# Patient Record
(demographics unavailable — no encounter records)

---

## 2024-11-07 NOTE — PHYSICAL EXAM
[Alert] : alert [Well Nourished] : well nourished [No Acute Distress] : no acute distress [Well Developed] : well developed [Normal Sclera/Conjunctiva] : normal sclera/conjunctiva [EOMI] : extra ocular movement intact [Normal Oropharynx] : the oropharynx was normal [No Respiratory Distress] : no respiratory distress [No Accessory Muscle Use] : no accessory muscle use [Clear to Auscultation] : lungs were clear to auscultation bilaterally [Normal S1, S2] : normal S1 and S2 [Normal Rate] : heart rate was normal [Regular Rhythm] : with a regular rhythm [No Edema] : no peripheral edema [Pedal Pulses Normal] : the pedal pulses are present [Normal Bowel Sounds] : normal bowel sounds [Not Tender] : non-tender [Not Distended] : not distended [Soft] : abdomen soft [Normal Anterior Cervical Nodes] : no anterior cervical lymphadenopathy [No Tremors] : no tremors [Oriented x3] : oriented to person, place, and time [Acanthosis Nigricans] : no acanthosis nigricans [de-identified] : slight proptosis  [de-identified] : L thyroid nodule palpated, thyroid mild enlarged

## 2024-11-07 NOTE — ASSESSMENT
[FreeTextEntry1] : She just got diagnosed w hemochromatosis/.   51  year old female with hyperthyroidism secondary to Grave's disease with mild ABIMAEL. Now She developed significant hypoThyroidism .  check Tpo and Tg ab. Hashimoto's' taking over??  thyroid US shows no nodules.  ABIMAEL continue to followup with ophthalmo DR Huff .   continue selenium to 100 mg BID  complains of excessive lacrimation just started Lt4 50 mcg qd 1 week ago claims she feels better since them. Repeat Tfts in 5 weeks and will adjust the dose.   Overweight:  gained 7 lbs. discussed diet and exercise encouraged more exercise walking 30 min 3 x week  Vitamin d defic : cont supplements   HLD: lipid way worse bc of hypoT. Will repeat once tfts corrected.   No h/o CAD  Hot flashes: use estro patches from gyn   All lab results reviewed independently and discussed with patient with extensive discussion.  All questions answered Laboratory tests ordered today Continue other current medications

## 2025-02-10 NOTE — HISTORY OF PRESENT ILLNESS
[FreeTextEntry1] : Patient presents today for routine follow-up of hyperthyroidism secondary to Graves disease. TSI highly positive Thyroid US October 2020 without nodules. ABIMAEL continue to followup with ophthalmo DR Huff.    Current regimen: LT4 50 mcg daily, takes appropriately. Selenium 100 mg BID  Past meds: Methimazole 5 mg MWF, stopped December 2023. No longer needs to take propranolol.   Labs 01/2025: TSH 13.30 (71.20 in 11/2024) FT4 1.3 T3 80 , TG 69, HDL 99,

## 2025-02-10 NOTE — ASSESSMENT
[FreeTextEntry1] : keep same LT4 dose TSH lagging normal FT4 recheck in 4-6 weeks    HLD If remains elevated will discuss Statin next visit

## 2025-02-10 NOTE — REASON FOR VISIT
[Home] : at home, [unfilled] , at the time of the visit. [Other Location: e.g. Home (Enter Location, City,State)___] : at [unfilled] [Telephone (audio)] : This telephonic visit was provided via audio only technology. [Follow - Up] : a follow-up visit [Hyperthyroidism] : hyperthyroidism [Interpreters_IDNumber] : [id number]

## 2025-02-10 NOTE — REASON FOR VISIT
[Home] : at home, [unfilled] , at the time of the visit. [Other Location: e.g. Home (Enter Location, City,State)___] : at [unfilled] [Telephone (audio)] : This telephonic visit was provided via audio only technology. [Follow - Up] : a follow-up visit [Hyperthyroidism] : hyperthyroidism [Interpreters_IDNumber] : [id number]

## 2025-07-03 NOTE — ASSESSMENT
[FreeTextEntry1] : has hemochromatosis.  She gained 10 lbs.   52  year old female with hyperthyroidism secondary to Grave's disease and Hashimoto's with mild ABIMAEL.  TSi positive and  TPo positive. Now she developed hypoT  thyroid US shows no nodules.  ABIMAEL continue to follow-up with ophthalmo DR Huff .   TSh above target. She did not find lt4 for 7 days. Continue for now.  Overweight:  gained 10 lbs, probably due to menopausal changes. Discussed diet and exercise encouraged more exercise walking 30 min 3 x week  HLD: She has heavy family CAD/ CVA and multiple uncles.  and LDL but she had good HLD.  But with her family h/o i would favor start statin 20 mg qd .  Vitamin d defic : cont supplements   Hot flashes: follow-up for HRT with [facility] .She has severe sx of menopause.   All lab results reviewed independently and discussed with patient with extensive discussion.  All questions answered Laboratory tests ordered today Continue other current medications

## 2025-07-03 NOTE — PHYSICAL EXAM
[Alert] : alert [Well Nourished] : well nourished [No Acute Distress] : no acute distress [Well Developed] : well developed [Normal Sclera/Conjunctiva] : normal sclera/conjunctiva [EOMI] : extra ocular movement intact [Normal Oropharynx] : the oropharynx was normal [No Respiratory Distress] : no respiratory distress [No Accessory Muscle Use] : no accessory muscle use [Clear to Auscultation] : lungs were clear to auscultation bilaterally [Normal S1, S2] : normal S1 and S2 [Normal Rate] : heart rate was normal [Regular Rhythm] : with a regular rhythm [No Edema] : no peripheral edema [Pedal Pulses Normal] : the pedal pulses are present [Normal Bowel Sounds] : normal bowel sounds [Not Tender] : non-tender [Not Distended] : not distended [Soft] : abdomen soft [Normal Anterior Cervical Nodes] : no anterior cervical lymphadenopathy [No Tremors] : no tremors [Oriented x3] : oriented to person, place, and time [Acanthosis Nigricans] : no acanthosis nigricans [de-identified] : slight proptosis  [de-identified] : L thyroid nodule palpated, thyroid mild enlarged